# Patient Record
Sex: FEMALE | Race: BLACK OR AFRICAN AMERICAN | Employment: PART TIME | URBAN - METROPOLITAN AREA
[De-identification: names, ages, dates, MRNs, and addresses within clinical notes are randomized per-mention and may not be internally consistent; named-entity substitution may affect disease eponyms.]

---

## 2021-12-29 ENCOUNTER — OFFICE VISIT (OUTPATIENT)
Dept: FAMILY MEDICINE CLINIC | Age: 26
End: 2021-12-29
Payer: MEDICAID

## 2021-12-29 VITALS
HEART RATE: 100 BPM | TEMPERATURE: 97.5 F | DIASTOLIC BLOOD PRESSURE: 82 MMHG | WEIGHT: 255.8 LBS | HEIGHT: 66 IN | OXYGEN SATURATION: 97 % | BODY MASS INDEX: 41.11 KG/M2 | RESPIRATION RATE: 16 BRPM | SYSTOLIC BLOOD PRESSURE: 119 MMHG

## 2021-12-29 DIAGNOSIS — Z76.89 ESTABLISHING CARE WITH NEW DOCTOR, ENCOUNTER FOR: Primary | ICD-10-CM

## 2021-12-29 DIAGNOSIS — G47.33 OSA (OBSTRUCTIVE SLEEP APNEA): ICD-10-CM

## 2021-12-29 DIAGNOSIS — D50.9 IRON DEFICIENCY ANEMIA, UNSPECIFIED IRON DEFICIENCY ANEMIA TYPE: ICD-10-CM

## 2021-12-29 DIAGNOSIS — Z13.29 SCREENING FOR THYROID DISORDER: ICD-10-CM

## 2021-12-29 DIAGNOSIS — Z11.3 SCREEN FOR STD (SEXUALLY TRANSMITTED DISEASE): ICD-10-CM

## 2021-12-29 DIAGNOSIS — F41.9 ANXIETY: ICD-10-CM

## 2021-12-29 DIAGNOSIS — F41.0 PANIC DISORDER: ICD-10-CM

## 2021-12-29 DIAGNOSIS — Z13.21 ENCOUNTER FOR VITAMIN DEFICIENCY SCREENING: ICD-10-CM

## 2021-12-29 DIAGNOSIS — Z11.59 NEED FOR HEPATITIS C SCREENING TEST: ICD-10-CM

## 2021-12-29 DIAGNOSIS — Z13.1 SCREENING FOR DIABETES MELLITUS: ICD-10-CM

## 2021-12-29 DIAGNOSIS — Z13.220 SCREENING, LIPID: ICD-10-CM

## 2021-12-29 PROCEDURE — 99204 OFFICE O/P NEW MOD 45 MIN: CPT | Performed by: STUDENT IN AN ORGANIZED HEALTH CARE EDUCATION/TRAINING PROGRAM

## 2021-12-29 NOTE — PATIENT INSTRUCTIONS
Many people with depression and anxiety find relief without prescription drugs. These methods have all been scientifically proven to help ease symptoms. Here's what the research recommends:    EXERCISE  One of the most-studied natural approaches to treating depression, regular physical activity may lift mood in part by increasing certain neurotransmitters. Of course, embracing an exercise habit isn't easy for most people--especially those with depression. \"In my experience, the last thing depressed people want to do is move,\" says Dr. Keely Umanzor,  of the [facility]. \"But it has a striking effect. \"  Plus, it's free. COGNITIVE-BEHAVIORAL THERAPY  CBT, a type of talk therapy, focuses on changing negative thought patterns, and then learning how to home in on specific problems and find new ways to approach them. It typically lasts for 10 to 20 sessions. Some studies have shown it to be as effective as medication. [address] [place] [address] with depression often withdraw from the world, and this therapy seeks to bring them back in. Treatment involves helping people identify activities that add meaning to their life, like reading, volunteering or hanging out with friends, and encourages them to do these things without waiting for their mood to lift first. In a recent study published in the Lancet, this kind of therapy was shown to be as effective as CBT. And it costs much less, because practitioners don't need as much training. MINDFULNESS TRAINING  \"Thoughts and images are often the source of sadness and fear, and if you have no training in getting your attention away from them, you're helpless,\" says Guy Horvath. One such program, an eight-week small-group treatment called mindfulness-based cognitive therapy, trains people to be aware of the present moment through mindfulness practices like gentle yoga and daily meditation.  It was shown in a 2016 study in [address] [facility] to help people with recurrent depression avoid relapses even better than antidepressants. Cognitive Behavioral Skills You'll Need to Beat Anxiety    Five essential skills for overcoming anxiety and getting on with a happy life. 1. The ability to tolerate uncertainty. Studies have shown that intolerance of uncertainty is a key factor in anxiety and depression. People who can't tolerate uncertainty often avoid situations, procrastinate, seek reassurance constantly, delay taking action, do excessive checking, and refuse to delegate. 2. The ability to recognize rumination. Rumination is when you're repeatedly bothered by a worry thought. When people ruminate, their problem solving capacity is reduced. If you're ruminating, it's often best to wait to attempt to problem solve until you can think about the issue without jumping straight into rumination mode. If you can learn to recognize when you're ruminating, you can use cognitive behavioral therapy techniques, defusion techniques, or mindfulness techniques to help you stop ruminating. The best thing you can do when you're ruminating is accept that you're having whatever thoughts you're having, recognize that the thoughts might not be accurate, and allow the thoughts to pass in their own time rather than trying to block them out. Trying to block out distressing thoughts will just cause increased intensity and intrusions of the thoughts you're trying not to have. 3. The ability to recognize thought distortions. Types of thought distortions include: making excessively negative predictions, underestimating your ability to cope, personalizing, mind reading, catastrophizing, \"shoulds\" and \"musts,\" making judgments of yourself or others that are black and white rather than gray, entitlement thoughts (e.g., thinking that the normal rules shouldn't apply to you), and more.     The key is recognizing thought distortions is to ask yourself what thoughts you're having when you feel distressed. Some of these thoughts are likely to be thought distortions. 4. The ability and willingness to use mindfulness techniques. Mindfulness techniques can help reduce anxiety and increase willpower. Practicing mindfulness will help you stop avoidance coping, make better choices even when you're feeling anxious, and help you ruminate less. Try this 10-minute mindful walking exercise to get started. 5. The ability to talk to yourself kindly about your imperfections and mistakes. Criticizing yourself harshly when you make a mistake or when one of your personal imperfections shows up is likely to lead to rumination and avoidance coping. Research has shown that talking to yourself kindly not only helps you feel better--it also increases your motivation for self-improvement. Hydroxyzine (By mouth)   Hydroxyzine Pamoate (idq-JBXO-m-zeen ELIZA-oh-ate)  Treats anxiety, nausea, vomiting, allergies, skin rash, hives, and itching. May also be used with anesthesia for medical procedures. Brand Name(s): Vistaril   There may be other brand names for this medicine. When This Medicine Should Not Be Used: This medicine is not right for everyone. Do not use it if you had an allergic reaction to hydroxyzine, cetirizine, or levocetirizine. Do not use it during the early part of pregnancy or if you have prolonged QT interval.  How to Use This Medicine:   Capsule, Liquid, Tablet  · Your doctor will tell you how much medicine to use. Do not use more than directed. · Oral liquid: Measure the oral liquid medicine with a marked measuring spoon, oral syringe, or medicine cup. Shake well just before use. · Tablet: Swallow whole. Do not break, crush, or chew it. · Missed dose: Take a dose as soon as you remember. If it is almost time for your next dose, wait until then and take a regular dose. Do not take extra medicine to make up for a missed dose.   · Store the medicine in a closed container at room temperature, away from heat, moisture, and direct light. Drugs and Foods to Avoid:   Ask your doctor or pharmacist before using any other medicine, including over-the-counter medicines, vitamins, and herbal products. · Some medicines can affect how hydroxyzine works. Tell your doctor if you are using any of the following:  ¨ Droperidol, methadone, ondansetron, pentamidine  ¨ Antibiotic (including azithromycin, clarithromycin, erythromycin, gatifloxacin, moxifloxacin)  ¨ Medicine to treat depression (including citalopram, fluoxetine)  ¨ Medicine to treat heart rhythm problems (including amiodarone, procainamide, quinidine, sotalol)  ¨ Medicine to treat mental health problems (including chlorpromazine, clozapine, iloperidone, quetiapine, ziprasidone)  · Tell your doctor if you use anything else that makes you sleepy. Some examples are allergy medicine, narcotic pain medicine, and alcohol. Warnings While Using This Medicine:   · Tell your doctor if you are pregnant or breastfeeding, or if you have heart disease, heart failure, a slow heartbeat, skin problems, or had a recent heart attack. · This medicine may cause the following problems:  ¨ Changes in your heart rhythm  ¨ Serious skin reaction called acute generalized exanthematous pustulosis (AGEP)  · This medicine may make you drowsy. Do not drive or do anything that could be dangerous until you know how this medicine affects you. · Call your doctor if your symptoms do not improve or if they get worse. · Keep all medicine out of the reach of children. Never share your medicine with anyone.   Possible Side Effects While Using This Medicine:   Call your doctor right away if you notice any of these side effects:  · Allergic reaction: Itching or hives, swelling in your face or hands, swelling or tingling in your mouth or throat, chest tightness, trouble breathing  · Fainting, dizziness, lightheadedness  · Fast, pounding, or uneven heartbeat  · Fever, skin rash  · Severe tiredness  If you notice these less serious side effects, talk with your doctor:   · Drowsiness, sleepiness  · Dry mouth  If you notice other side effects that you think are caused by this medicine, tell your doctor. Call your doctor for medical advice about side effects. You may report side effects to FDA at 9-693-NGC-9604  © 2017 [facility] Information is for End User's use only and may not be sold, redistributed or otherwise used for commercial purposes. The above information is an  only. It is not intended as medical advice for individual conditions or treatments. Talk to your doctor, nurse or pharmacist before following any medical regimen to see if it is safe and effective for you.

## 2021-12-29 NOTE — PROGRESS NOTES
Lea Hernandez is a 27 y.o. female (: 1995) presenting to address:    Chief Complaint   Patient presents with   [address]     Patient DECLINED flu vaccine. Vitals:    21 1321   BP: 119/82   Pulse: 100   Resp: 16   Temp: 97.5 °F (36.4 °C)   TempSrc: Temporal   SpO2: 97%   Weight: 255 lb 12.8 oz (116 kg)   Height: 5' 6\" (1.676 m)   PainSc:   0 - No pain   LMP: 2021       Hearing/Vision:   No exam data present    Learning Assessment:     Learning Assessment 2021   PRIMARY LEARNER Patient   HIGHEST LEVEL OF EDUCATION - PRIMARY LEARNER  GRADUATED HIGH SCHOOL OR GED   PRIMARY LANGUAGE ENGLISH   LEARNER PREFERENCE PRIMARY DEMONSTRATION   ANSWERED BY self   RELATIONSHIP SELF     Depression Screening:   No flowsheet data found. Fall Risk Assessment:     Fall Risk Assessment, last 12 mths 2021   Able to walk? Yes   Fall in past 12 months? 0   Do you feel unsteady? 0   Are you worried about falling 0     Abuse Screening:     Abuse Screening Questionnaire 2021   Do you ever feel afraid of your partner? N   Are you in a relationship with someone who physically or mentally threatens you? N   Is it safe for you to go home? Y     ADL Assessment:   No flowsheet data found. Coordination of Care Questionaire:   1. \"Have you been to the ER, urgent care clinic since your last visit? Hospitalized since your last visit? \" No    2. \"Have you seen or consulted any other health care providers outside of the [address] [place] [address] since your last visit? \" No     3. For patients aged 39-70: Has the patient had a colonoscopy? NA based on age or sex     If the patient is female:    3. For patients aged 41-77: Has the patient had a mammogram within the past 2 years? NA based on age or sex    11. For patients aged 21-65: Has the patient had a pap smear? No    Advanced Directive:   1. Do you have an Advanced Directive? NO    2. Would you like information on Advanced Directives?  NO

## 2021-12-29 NOTE — PROGRESS NOTES
Note to patient:  The purpose of this note is to communicate optimally with the other physicians / APCs involved in your care. It is written using standard medical terminology. If you have questions regarding the details of the note, please contact my office to schedule an appointment to address your questions. Emil Meyers   Primary Care Office Visit - Problem-Oriented    : 1995   Ambreen Marley is a 27 y.o. female presenting for  Chief Complaint   Patient presents with   [facility] Establish Care          Assessment/Plan:       ICD-10-CM ICD-9-CM   1. Establishing care with new doctor, encounter for  Z76.89 V65.8   2. BMI 40.0-44.9, adult ([address]  Z68.41 V85.41   3. Anxiety  F41.9 300.00   4. Screening for thyroid disorder  Z13.29 V77.0   5. Encounter for vitamin deficiency screening  Z13.21 V77.99   6. Screening for diabetes mellitus  Z13.1 V77.1   7. Need for hepatitis C screening test  Z11.59 V73.89   8. Screening, lipid  Z13.220 V77.91   9. Iron deficiency anemia, unspecified iron deficiency anemia type  D50.9 280.9   10. Screen for STD (sexually transmitted disease)  Z11.3 V74.5   11. LIAM (obstructive sleep apnea)  G47.33 327.23   12. Panic disorder  F41.0 300.01     #LIAM - Had CPAP and lost in transition of states   Last sleep study - 4yrs ago   Referral to sleep medicine    #Body mass index is 41.29 kg/m². Counseled on diet and exercise methods. Positive reinforcement provided. Actively working on weight loss, Already lost 11 lbs! Established with Bariatric     Hx of #significant anxiety with panic and multiple hospitalizations; last panic attack years ago   Declines interest in starting treatment to reduce frequency/severity of symptoms  Reviewed risk/benefit of both preventative daily treatment and rescue (prn) medication. Provided handout describing Vistaril. Reviewed prior med trials. With meds, mostly felt groggy;  Ativan effective for panic, Response to zoloft was \"ok\"  Counseled on nonpharmacologic interventions that could improve outcomes as well including exercise, therapy/counseling, and self care. #Iron deficiency anemia - unclear control  Will check levels    #HM  Reports from [place] and vaccines up to date including HPV  Cervical CA screening - Last PAP 4 years ago - Normal    Orders Placed This Encounter    HEMOGLOBIN A1C WITH EAG     Standing Status:   Future     Standing Expiration Date:   12/29/2022    CBC WITH AUTOMATED DIFF     Standing Status:   Future     Standing Expiration Date:   02/16/6513    METABOLIC PANEL, COMPREHENSIVE     Standing Status:   Future     Standing Expiration Date:   12/29/2022    T4, FREE     Standing Status:   Future     Standing Expiration Date:   12/29/2022    LIPID PANEL     Standing Status:   Future     Standing Expiration Date:   12/29/2022    TSH 3RD GENERATION     Standing Status:   Future     Standing Expiration Date:   12/29/2022    VITAMIN D, 25 HYDROXY     Standing Status:   Future     Standing Expiration Date:   12/29/2022    FERRITIN     Standing Status:   Future     Standing Expiration Date:   12/30/2022    IRON PROFILE     Standing Status:   Future     Standing Expiration Date:   12/30/2022    VITAMIN B12     Standing Status:   Future     Standing Expiration Date:   12/30/2022    HIV 1/2 AG/AB, 4TH GENERATION,W RFLX CONFIRM     Standing Status:   Future     Standing Expiration Date:   12/30/2022    RPR     Standing Status:   Future     Standing Expiration Date:   12/29/2022    HEPATITIS C AB     Standing Status:   Future     Standing Expiration Date:   12/30/2022   [facility] REFERRAL     Referral Priority:   Routine     Referral Type:   Consultation     Referral Reason:   Specialty Services Required     Requested Specialty:   Sleep Medicine     Number of Visits Requested:   1     Follow-up and Dispositions    · Return for when able to update PAP .        Reviewed management plan & instructions with patient, who voiced understanding. Subjective   History:   Elsa Don is a 27 y.o. female presenting to address:  Chief Complaint   Patient presents with   [employer] Establish Care     Extensive review of medical history and medications completed to facilitate transfer of care. Review of Systems:     A comprehensive review of systems was negative except for that written in the HPI and A/P         Objective     Physical Assessment:     Visit Vitals  /82 (BP 1 Location: Left upper arm, BP Patient Position: Sitting, BP Cuff Size: Large adult)   Pulse 100   Temp 97.5 °F (36.4 °C) (Temporal)   Resp 16   Ht 5' 6\" (1.676 m)   Wt 255 lb 12.8 oz (116 kg)   LMP 12/17/2021   SpO2 97%   BMI 41.29 kg/m²       Physical Exam  Vitals and nursing note reviewed. Constitutional:       General: She is not in acute distress. Cardiovascular:      Rate and Rhythm: Regular rhythm. Tachycardia present. Pulses: Normal pulses. Pulmonary:      Effort: Pulmonary effort is normal. No respiratory distress. Neurological:      Mental Status: She is alert and oriented to person, place, and time. Gait: Gait normal.   Psychiatric:         Mood and Affect: Mood normal.         Behavior: Behavior normal.         Thought Content: Thought content normal.         Judgment: Judgment normal.                 This document may have been created with the aid of dictation software. Text may contain errors, particularly phonetic errors.       Filemon Ott, DO  Family Medicine  12/29/2021

## 2022-04-13 ENCOUNTER — OFFICE VISIT (OUTPATIENT)
Dept: FAMILY MEDICINE CLINIC | Age: 27
End: 2022-04-13

## 2022-04-13 ENCOUNTER — APPOINTMENT (OUTPATIENT)
Dept: FAMILY MEDICINE CLINIC | Age: 27
End: 2022-04-13

## 2022-04-13 VITALS
DIASTOLIC BLOOD PRESSURE: 82 MMHG | BODY MASS INDEX: 39.02 KG/M2 | HEART RATE: 99 BPM | SYSTOLIC BLOOD PRESSURE: 118 MMHG | TEMPERATURE: 98.1 F | OXYGEN SATURATION: 98 % | RESPIRATION RATE: 16 BRPM | WEIGHT: 242.8 LBS | HEIGHT: 66 IN

## 2022-04-13 DIAGNOSIS — Z13.21 ENCOUNTER FOR VITAMIN DEFICIENCY SCREENING: ICD-10-CM

## 2022-04-13 DIAGNOSIS — Z13.29 SCREENING FOR THYROID DISORDER: ICD-10-CM

## 2022-04-13 DIAGNOSIS — G47.33 OSA (OBSTRUCTIVE SLEEP APNEA): ICD-10-CM

## 2022-04-13 DIAGNOSIS — Z11.59 NEED FOR HEPATITIS C SCREENING TEST: ICD-10-CM

## 2022-04-13 DIAGNOSIS — F41.9 ANXIETY: ICD-10-CM

## 2022-04-13 DIAGNOSIS — Z13.1 SCREENING FOR DIABETES MELLITUS: ICD-10-CM

## 2022-04-13 DIAGNOSIS — F41.0 PANIC DISORDER: ICD-10-CM

## 2022-04-13 DIAGNOSIS — Z13.220 SCREENING, LIPID: ICD-10-CM

## 2022-04-13 DIAGNOSIS — R42 POSTURAL LIGHTHEADEDNESS: ICD-10-CM

## 2022-04-13 DIAGNOSIS — Z11.3 SCREEN FOR STD (SEXUALLY TRANSMITTED DISEASE): ICD-10-CM

## 2022-04-13 DIAGNOSIS — D50.9 IRON DEFICIENCY ANEMIA, UNSPECIFIED IRON DEFICIENCY ANEMIA TYPE: ICD-10-CM

## 2022-04-13 PROCEDURE — 99214 OFFICE O/P EST MOD 30 MIN: CPT | Performed by: STUDENT IN AN ORGANIZED HEALTH CARE EDUCATION/TRAINING PROGRAM

## 2022-04-13 RX ORDER — ESCITALOPRAM OXALATE 10 MG/1
10 TABLET ORAL DAILY
Qty: 90 TABLET | Refills: 1 | Status: SHIPPED | OUTPATIENT
Start: 2022-04-13 | End: 2022-08-02

## 2022-04-13 RX ORDER — LORATADINE 10 MG/1
10 TABLET ORAL DAILY
COMMUNITY
Start: 2022-04-05

## 2022-04-13 RX ORDER — ESCITALOPRAM OXALATE 5 MG/1
TABLET ORAL
Qty: 7 TABLET | Refills: 1 | Status: SHIPPED | OUTPATIENT
Start: 2022-04-13 | End: 2022-08-02

## 2022-04-13 NOTE — LETTER
NOTIFICATION RETURN TO WORK / SCHOOL    4/13/2022 2:39 PM    Ms. [address] [facility] [address]      To Whom It May Concern:    Saji Cruz is currently under the care of [address] [place] [address]. She was seen in office 4/13/2022 and can return to work 4/14/22 without restriction. If there are questions or concerns please have the patient contact our office.         Sincerely,        Em Varner, DO

## 2022-04-13 NOTE — PATIENT INSTRUCTIONS
[facility]  [facility] [address]  [address] [facility],  Rupinder Agudelo  Tel: [phone]  Fax: [phone]    Many people with depression and anxiety can find relief with adding some of the following methods to their treatment plan. These methods have all been scientifically proven to help ease symptoms. Here's what the research recommends:    EXERCISE  One of the most-studied natural approaches to treating depression, regular physical activity may lift mood in part by increasing certain neurotransmitters. Of course, embracing an exercise habit isn't easy for most people--especially those with depression. \"In my experience, the last thing depressed people want to do is move,\" says Dr. Brian Son,  of the [facility]. \"But it has a striking effect. \"  Plus, it's free. COGNITIVE-BEHAVIORAL THERAPY  CBT, a type of talk therapy, focuses on changing negative thought patterns, and then learning how to home in on specific problems and find new ways to approach them. It typically lasts for 10 to 20 sessions. Some studies have shown it to be as effective as medication. [address] [place] [address] with depression often withdraw from the world, and this therapy seeks to bring them back in. Treatment involves helping people identify activities that add meaning to their life, like reading, volunteering or hanging out with friends, and encourages them to do these things without waiting for their mood to lift first. In a recent study published in the Lancet, this kind of therapy was shown to be as effective as CBT. And it costs much less, because practitioners don't need as much training. MINDFULNESS TRAINING  \"Thoughts and images are often the source of sadness and fear, and if you have no training in getting your attention away from them, you're helpless,\" says Shamar Morfin.  One such program, an eight-week small-group treatment called mindfulness-based cognitive therapy, trains people to be aware of the present moment through mindfulness practices like gentle yoga and daily meditation. It was shown in a 2016 study in [address] [facility] to help people with recurrent depression avoid relapses even better than antidepressants. Cognitive Behavioral Skills You'll Need to Beat Anxiety    Five essential skills for overcoming anxiety and getting on with a happy life. 1. The ability to tolerate uncertainty. Studies have shown that intolerance of uncertainty is a key factor in anxiety and depression. People who can't tolerate uncertainty often avoid situations, procrastinate, seek reassurance constantly, delay taking action, do excessive checking, and refuse to delegate. 2. The ability to recognize rumination. Rumination is when you're repeatedly bothered by a worry thought. When people ruminate, their problem solving capacity is reduced. If you're ruminating, it's often best to wait to attempt to problem solve until you can think about the issue without jumping straight into rumination mode. If you can learn to recognize when you're ruminating, you can use cognitive behavioral therapy techniques, defusion techniques, or mindfulness techniques to help you stop ruminating. The best thing you can do when you're ruminating is accept that you're having whatever thoughts you're having, recognize that the thoughts might not be accurate, and allow the thoughts to pass in their own time rather than trying to block them out. Trying to block out distressing thoughts will just cause increased intensity and intrusions of the thoughts you're trying not to have. 3. The ability to recognize thought distortions.     Types of thought distortions include: making excessively negative predictions, underestimating your ability to cope, personalizing, mind reading, catastrophizing, \"shoulds\" and \"musts,\" making judgments of yourself or others that are black and white rather than gray, entitlement thoughts (e.g., thinking that the normal rules shouldn't apply to you), and more. The key is recognizing thought distortions is to ask yourself what thoughts you're having when you feel distressed. Some of these thoughts are likely to be thought distortions. 4. The ability and willingness to use mindfulness techniques. Mindfulness techniques can help reduce anxiety and increase willpower. Practicing mindfulness will help you stop avoidance coping, make better choices even when you're feeling anxious, and help you ruminate less. Try this 10-minute mindful walking exercise to get started. 5. The ability to talk to yourself kindly about your imperfections and mistakes. Criticizing yourself harshly when you make a mistake or when one of your personal imperfections shows up is likely to lead to rumination and avoidance coping. Research has shown that talking to yourself kindly not only helps you feel better--it also increases your motivation for self-improvement.

## 2022-04-13 NOTE — PROGRESS NOTES
Ricki Pineda is a 27 y.o. female (: 1995) presenting to address:    Chief Complaint   Patient presents with    Dizziness     follow up from [facility] on 22    Anxiety       Vitals:    22 1409   BP: 118/82   Pulse: 99   Resp: 16   Temp: 98.1 °F (36.7 °C)   TempSrc: Temporal   SpO2: 98%   Weight: 242 lb 12.8 oz (110.1 kg)   Height: 5' 6\" (1.676 m)   PainSc:   0 - No pain       Hearing/Vision:   No exam data present    Learning Assessment:     Learning Assessment 2021   PRIMARY LEARNER Patient   HIGHEST LEVEL OF EDUCATION - PRIMARY LEARNER  GRADUATED HIGH SCHOOL OR GED   PRIMARY LANGUAGE ENGLISH   LEARNER PREFERENCE PRIMARY DEMONSTRATION   ANSWERED BY self   RELATIONSHIP SELF     Depression Screening:     3 most recent PHQ Screens 2022   Little interest or pleasure in doing things Several days   Feeling down, depressed, irritable, or hopeless Several days   Total Score PHQ 2 2   Trouble falling or staying asleep, or sleeping too much Several days   Feeling tired or having little energy Several days   Poor appetite, weight loss, or overeating Several days   Feeling bad about yourself - or that you are a failure or have let yourself or your family down Several days   Trouble concentrating on things such as school, work, reading, or watching TV Several days   Moving or speaking so slowly that other people could have noticed; or the opposite being so fidgety that others notice Several days   Thoughts of being better off dead, or hurting yourself in some way Not at all   PHQ 9 Score 8   How difficult have these problems made it for you to do your work, take care of your home and get along with others (No Data)     Fall Risk Assessment:     Fall Risk Assessment, last 12 mths 2022   Able to walk? Yes   Fall in past 12 months? 0   Do you feel unsteady?  0   Are you worried about falling 0     Abuse Screening:     Abuse Screening Questionnaire 2022   Do you ever feel afraid of your partner? N   Are you in a relationship with someone who physically or mentally threatens you? N   Is it safe for you to go home? Y     ADL Assessment:   No flowsheet data found. Coordination of Care Questionaire:   1. \"Have you been to the ER, urgent care clinic since your last visit? Hospitalized since your last visit? \" Yes Where: [facility] Reason for visit: Dizziness    2. \"Have you seen or consulted any other health care providers outside of the [address] [place] [address] since your last visit? \" No     3. For patients aged 39-70: Has the patient had a colonoscopy? NA - based on age     If the patient is female:    4. For patients aged 41-77: Has the patient had a mammogram within the past 2 years? NA - based on age    11. For patients aged 21-65: Has the patient had a pap smear? No    Advanced Directive:   1. Do you have an Advanced Directive? NO    2. Would you like information on Advanced Directives?  NO

## 2022-04-14 LAB
25(OH)D3+25(OH)D2 SERPL-MCNC: 25.9 NG/ML (ref 30–100)
ALBUMIN SERPL-MCNC: 3.9 G/DL (ref 3.9–5)
ALBUMIN/GLOB SERPL: 1.1 {RATIO} (ref 1.2–2.2)
ALP SERPL-CCNC: 63 IU/L (ref 44–121)
ALT SERPL-CCNC: 7 IU/L (ref 0–32)
AST SERPL-CCNC: 15 IU/L (ref 0–40)
BASOPHILS # BLD AUTO: 0.1 X10E3/UL (ref 0–0.2)
BASOPHILS NFR BLD AUTO: 1 %
BILIRUB SERPL-MCNC: 0.2 MG/DL (ref 0–1.2)
BUN SERPL-MCNC: 11 MG/DL (ref 6–20)
BUN/CREAT SERPL: 17 (ref 9–23)
CALCIUM SERPL-MCNC: 9.2 MG/DL (ref 8.7–10.2)
CHLORIDE SERPL-SCNC: 100 MMOL/L (ref 96–106)
CHOLEST SERPL-MCNC: 179 MG/DL (ref 100–199)
CO2 SERPL-SCNC: 22 MMOL/L (ref 20–29)
CREAT SERPL-MCNC: 0.64 MG/DL (ref 0.57–1)
EGFR: 126 ML/MIN/1.73
EOSINOPHIL # BLD AUTO: 0.1 X10E3/UL (ref 0–0.4)
EOSINOPHIL NFR BLD AUTO: 1 %
ERYTHROCYTE [DISTWIDTH] IN BLOOD BY AUTOMATED COUNT: 16.8 % (ref 11.7–15.4)
EST. AVERAGE GLUCOSE BLD GHB EST-MCNC: 120 MG/DL
FERRITIN SERPL-MCNC: 7 NG/ML (ref 15–150)
GLOBULIN SER CALC-MCNC: 3.7 G/DL (ref 1.5–4.5)
GLUCOSE SERPL-MCNC: 97 MG/DL (ref 65–99)
HBA1C MFR BLD: 5.8 % (ref 4.8–5.6)
HCT VFR BLD AUTO: 38.4 % (ref 34–46.6)
HCV AB S/CO SERPL IA: 0.3 S/CO RATIO (ref 0–0.9)
HDLC SERPL-MCNC: 46 MG/DL
HGB BLD-MCNC: 11 G/DL (ref 11.1–15.9)
HIV 1+2 AB+HIV1 P24 AG SERPL QL IA: NON REACTIVE
IMM GRANULOCYTES # BLD AUTO: 0.1 X10E3/UL (ref 0–0.1)
IMM GRANULOCYTES NFR BLD AUTO: 1 %
IMP & REVIEW OF LAB RESULTS: NORMAL
IRON SATN MFR SERPL: 19 % (ref 15–55)
IRON SERPL-MCNC: 81 UG/DL (ref 27–159)
LDLC SERPL CALC-MCNC: 115 MG/DL (ref 0–99)
LYMPHOCYTES # BLD AUTO: 2.4 X10E3/UL (ref 0.7–3.1)
LYMPHOCYTES NFR BLD AUTO: 30 %
MCH RBC QN AUTO: 19.5 PG (ref 26.6–33)
MCHC RBC AUTO-ENTMCNC: 28.6 G/DL (ref 31.5–35.7)
MCV RBC AUTO: 68 FL (ref 79–97)
MONOCYTES # BLD AUTO: 0.7 X10E3/UL (ref 0.1–0.9)
MONOCYTES NFR BLD AUTO: 9 %
NEUTROPHILS # BLD AUTO: 4.6 X10E3/UL (ref 1.4–7)
NEUTROPHILS NFR BLD AUTO: 58 %
PLATELET # BLD AUTO: 293 X10E3/UL (ref 150–450)
POTASSIUM SERPL-SCNC: 3.9 MMOL/L (ref 3.5–5.2)
PROT SERPL-MCNC: 7.6 G/DL (ref 6–8.5)
RBC # BLD AUTO: 5.63 X10E6/UL (ref 3.77–5.28)
RPR SER QL: NON REACTIVE
SODIUM SERPL-SCNC: 137 MMOL/L (ref 134–144)
T4 FREE SERPL-MCNC: 1.13 NG/DL (ref 0.82–1.77)
TIBC SERPL-MCNC: 417 UG/DL (ref 250–450)
TRIGL SERPL-MCNC: 96 MG/DL (ref 0–149)
TSH SERPL DL<=0.005 MIU/L-ACNC: 1.21 UIU/ML (ref 0.45–4.5)
UIBC SERPL-MCNC: 336 UG/DL (ref 131–425)
VIT B12 SERPL-MCNC: 635 PG/ML (ref 232–1245)
VLDLC SERPL CALC-MCNC: 18 MG/DL (ref 5–40)
WBC # BLD AUTO: 7.8 X10E3/UL (ref 3.4–10.8)

## 2022-05-05 ENCOUNTER — TELEPHONE (OUTPATIENT)
Dept: FAMILY MEDICINE CLINIC | Age: 27
End: 2022-05-05

## 2022-05-05 NOTE — TELEPHONE ENCOUNTER
Called patient to informed that she needs to contact pharmacy due to at visit she was informed that the Lexapro 5 mg was to be increased to the Lexapro 10 mg if tolerating well. Pharmacy already has new dose prescription on file. unable to leave  due to VM not set up.

## 2022-05-05 NOTE — TELEPHONE ENCOUNTER
----- Message from [place] sent at 5/5/2022 10:29 AM EDT -----  Subject: Refill Request    QUESTIONS  Name of Medication? escitalopram oxalate (LEXAPRO) 5 mg tablet  Patient-reported dosage and instructions? 5mg   How many days do you have left? 2  Preferred Pharmacy? [address] [facility] phone number (if available)? [phone]  ---------------------------------------------------------------------------  --------------  CALL BACK INFO  What is the best way for the office to contact you? OK to leave message on   voicemail  Preferred Call Back Phone Number? [phone]  ---------------------------------------------------------------------------  --------------  SCRIPT ANSWERS  Relationship to Patient?  Self

## 2022-08-02 ENCOUNTER — OFFICE VISIT (OUTPATIENT)
Dept: FAMILY MEDICINE CLINIC | Age: 27
End: 2022-08-02
Payer: MEDICAID

## 2022-08-02 VITALS
RESPIRATION RATE: 16 BRPM | TEMPERATURE: 98.5 F | WEIGHT: 238 LBS | SYSTOLIC BLOOD PRESSURE: 112 MMHG | BODY MASS INDEX: 38.25 KG/M2 | OXYGEN SATURATION: 97 % | HEART RATE: 102 BPM | DIASTOLIC BLOOD PRESSURE: 75 MMHG | HEIGHT: 66 IN

## 2022-08-02 DIAGNOSIS — R42 POSTURAL LIGHTHEADEDNESS: ICD-10-CM

## 2022-08-02 DIAGNOSIS — F41.9 ANXIETY: ICD-10-CM

## 2022-08-02 DIAGNOSIS — D50.9 IRON DEFICIENCY ANEMIA, UNSPECIFIED IRON DEFICIENCY ANEMIA TYPE: ICD-10-CM

## 2022-08-02 DIAGNOSIS — R73.03 PREDIABETES: Primary | ICD-10-CM

## 2022-08-02 DIAGNOSIS — Z09 HOSPITAL DISCHARGE FOLLOW-UP: ICD-10-CM

## 2022-08-02 DIAGNOSIS — B36.0 TINEA VERSICOLOR: ICD-10-CM

## 2022-08-02 DIAGNOSIS — R00.2 PALPITATIONS: ICD-10-CM

## 2022-08-02 DIAGNOSIS — E66.01 CLASS 2 SEVERE OBESITY DUE TO EXCESS CALORIES WITH SERIOUS COMORBIDITY AND BODY MASS INDEX (BMI) OF 38.0 TO 38.9 IN ADULT (HCC): ICD-10-CM

## 2022-08-02 LAB — HBA1C MFR BLD HPLC: 5.7 %

## 2022-08-02 PROCEDURE — 83036 HEMOGLOBIN GLYCOSYLATED A1C: CPT | Performed by: STUDENT IN AN ORGANIZED HEALTH CARE EDUCATION/TRAINING PROGRAM

## 2022-08-02 PROCEDURE — 99214 OFFICE O/P EST MOD 30 MIN: CPT | Performed by: STUDENT IN AN ORGANIZED HEALTH CARE EDUCATION/TRAINING PROGRAM

## 2022-08-02 RX ORDER — BUSPIRONE HYDROCHLORIDE 5 MG/1
TABLET ORAL
Qty: 90 TABLET | Refills: 1 | Status: SHIPPED | OUTPATIENT
Start: 2022-08-02

## 2022-08-02 RX ORDER — MECLIZINE HYDROCHLORIDE 25 MG/1
25 TABLET ORAL AS NEEDED
COMMUNITY
Start: 2022-07-01 | End: 2022-08-02 | Stop reason: ALTCHOICE

## 2022-08-02 RX ORDER — KETOCONAZOLE 20 MG/G
CREAM TOPICAL
Qty: 30 G | Refills: 2 | Status: SHIPPED | OUTPATIENT
Start: 2022-08-02

## 2022-08-02 RX ORDER — LANOLIN ALCOHOL/MO/W.PET/CERES
325 CREAM (GRAM) TOPICAL EVERY OTHER DAY
Qty: 45 TABLET | Refills: 1
Start: 2022-08-02

## 2022-08-02 NOTE — PATIENT INSTRUCTIONS
Your blood levels and iron studies show some iron deficiency anemia which can contribute to symptoms of fatigue, weakness, headaches, restless legs, and exercise intolerance. To correct this I would supplement with over the counter 65mg Iron every other day. Sometimes this supplement can cause constipation and if you notice this you can try reducing the dosing or switching to a slow absorption iron supplement, or liquid iron to see if better tolerated. Our goal would be to improve iron storage level (ferritin) above 75.

## 2022-08-02 NOTE — PROGRESS NOTES
Marlee Gill (: 1995) is a 27 y.o. female, ESTABLISHED patient, here for FOLLOW UP:    ICD-10-CM ICD-9-CM   1. Prediabetes  R73.03 790.29   2. Palpitations  R00.2 785.1   3. Class 2 severe obesity due to excess calories with serious comorbidity and body mass index (BMI) of 38.0 to 38.9 in adult (HCC)  E66.01 278.01    Z68.38 V85.38   4. Postural lightheadedness  R42 780.4   5. Anxiety  F41.9 300.00   6. Iron deficiency anemia, unspecified iron deficiency anemia type  D50.9 280.9   7. Tinea versicolor  B36.0 111.0   8. Hospital discharge follow-up  Z09 V67.59     Assessment   Plan     #Postural Lightheadedness - no LOC; [place] 2022 however left job shortly after without improvement   Description: Feeling like going to faint, lightheaded (90% of day), Worse around bright light, people, motion; Not room spinning; Feels like constantly on elevator   Reviewed vestibular exercises and provided handout to try at home   Rec review ED visit 22, 22; CT Brain 22 IMPRESSION Negative noncontrast head CT. Reviewed uncontrolled LIAM could be contributing   Encouraged to update eye exam   Plan for Holter    #LIAM - untreated  Had CPAP and lost in transition of states   Last sleep study - 4yrs ago   Referral to sleep medicine provided 1/15/22, forgot to schedule. Provided number of Neurologist and encouraged to set up evaluation as uncontrolled LIAM can contribute to many of recent sx. Hx of #significant anxiety with panic and multiple hospitalizations; last panic attack years ago   Counseled on nonpharmacologic interventions that could improve outcomes as well including exercise, therapy/counseling, and self care. Prior drug trials: With meds, mostly felt groggy  Ativan effective for panic  Response to zoloft was \"ok\"  Tried Lexapro up to 10mg - used on and off <1mo;  Pt felt like mood was more down than normal and discontinued    Current regimen/adjustments:  Plan to trail Buspar up to TID prn for anxiety     #Iron deficiency anemia   Was taking once daily Fe supplement but was feeling upset stomach and stopped a few weeks ago. Reviewed can reduce dosing or try different form of supplement to help reduce AE. Lab Results   Component Value Date/Time    Iron 81 04/13/2022 02:54 AM    TIBC 417 04/13/2022 02:54 AM    Iron % saturation 19 04/13/2022 02:54 AM    Ferritin 7 (L) 04/13/2022 02:54 AM     #Body mass index is 39.19 kg/m². - good success with weight loss efforts! Counseled on diet and exercise methods. Positive reinforcement provided. Established with Bariatric, planning to continue with diet/exercise efforts at present and not schedule surgery. Wt Readings from Last 3 Encounters:   08/02/22 238 lb (108 kg)   04/13/22 242 lb 12.8 oz (110.1 kg)   12/29/21 255 lb 12.8 oz (116 kg)     #HLD - Goal < 100  Will monitor and encourage to work on heart healthy habits. Lab Results   Component Value Date/Time    LDL, calculated 115 (H) 04/13/2022 02:54 AM     #Prediabetes  Reviewed diagnosis and recommendations for behavioral/dietary changes to improve. Encouraged to continue once to twice yearly monitoring of A1C. Also discussed consideration of initiating Metformin to help reduce insulin resistance. Lab Results   Component Value Date/Time    Hemoglobin A1c 5.8 (H) 04/13/2022 02:54 AM     #HM  Reports from [place] and vaccines up to date including HPV        Orders Placed This Encounter    AMB POC HEMOGLOBIN A1C    DISCONTD: meclizine (ANTIVERT) 25 mg tablet     Sig: Take 25 mg by mouth as needed. busPIRone (BUSPAR) 5 mg tablet     Sig: Take 1 tablet by mouth up to three times day as needed for anxiety; Max initial dosing 15mg/day     Dispense:  90 Tablet     Refill:  1    ferrous sulfate 325 mg (65 mg iron) tablet     Sig: Take 1 Tablet by mouth every other day.  Indications: anemia from inadequate iron     Dispense:  45 Tablet Refill:  1    ketoconazole (NIZORAL) 2 % topical cream     Sig: Applied once daily for 11 to 22 days (at least a few days past complete resolution, typically 14 days)     Dispense:  30 g     Refill:  2     Return in about 1 month (around 9/2/2022) for 30 min follow up, revaluate symptoms . Subjective   Last PCP visit: 5/31/2022  Since last visit:   Any changes in health, procedures, hospital visits, or specialist visits? See A/P  Tolerating medications without adverse reactions? Stopped Lexapro     Current Outpatient Medications   Medication Instructions    busPIRone (BUSPAR) 5 mg tablet Take 1 tablet by mouth up to three times day as needed for anxiety; Max initial dosing 15mg/day    ferrous sulfate 325 mg, Oral, EVERY OTHER DAY    ketoconazole (NIZORAL) 2 % topical cream Applied once daily for 11 to 22 days (at least a few days past complete resolution, typically 14 days)    loratadine (CLARITIN) 10 mg, DAILY      Objective   Visit Vitals  /75 (BP 1 Location: Right arm, BP Patient Position: Sitting, BP Cuff Size: Large adult)   Pulse (!) 102   Temp 98.5 °F (36.9 °C) (Temporal)   Resp 16   Ht 5' 6\" (1.676 m)   Wt 238 lb (108 kg)   LMP  (Within Weeks)   SpO2 97%   BMI 38.41 kg/m²     Physical Exam  Vitals and nursing note reviewed. Constitutional:       General: She is not in acute distress. Interventions: Face mask in place. Cardiovascular:      Rate and Rhythm: Regular rhythm. Tachycardia present. Pulses: Normal pulses. Pulmonary:      Effort: Pulmonary effort is normal. No respiratory distress. Neurological:      Mental Status: She is alert and oriented to person, place, and time. Gait: Gait normal.   Psychiatric:         Attention and Perception: Attention normal.         Mood and Affect: Mood is anxious and depressed.          Behavior: Behavior normal.          Carolyn Turner,   Family Medicine  08/02/2022

## 2022-08-02 NOTE — PROGRESS NOTES
Kanu Lopes is a 27 y.o. female (: 1995) presenting to address:    Chief Complaint   Patient presents with    Medication Refill    Results     Lab review       Vitals:    22 0948   BP: 112/75   Pulse: (!) 102   Resp: 16   Temp: 98.5 °F (36.9 °C)   TempSrc: Temporal   SpO2: 97%   Weight: 238 lb (108 kg)   Height: 5' 6\" (1.676 m)   PainSc:   0 - No pain       Hearing/Vision:   No results found. Learning Assessment:     Learning Assessment 2021   PRIMARY LEARNER Patient   HIGHEST LEVEL OF EDUCATION - PRIMARY LEARNER  GRADUATED HIGH SCHOOL OR GED   PRIMARY LANGUAGE ENGLISH   LEARNER PREFERENCE PRIMARY DEMONSTRATION   ANSWERED BY self   RELATIONSHIP SELF     Depression Screening:     3 most recent PHQ Screens 2022   Little interest or pleasure in doing things Several days   Feeling down, depressed, irritable, or hopeless Several days   Total Score PHQ 2 2   Trouble falling or staying asleep, or sleeping too much Several days   Feeling tired or having little energy Several days   Poor appetite, weight loss, or overeating Several days   Feeling bad about yourself - or that you are a failure or have let yourself or your family down Several days   Trouble concentrating on things such as school, work, reading, or watching TV Several days   Moving or speaking so slowly that other people could have noticed; or the opposite being so fidgety that others notice Several days   Thoughts of being better off dead, or hurting yourself in some way Not at all   PHQ 9 Score 8   How difficult have these problems made it for you to do your work, take care of your home and get along with others (No Data)     Fall Risk Assessment:     Fall Risk Assessment, last 12 mths 2022   Able to walk? Yes   Fall in past 12 months? 0   Do you feel unsteady? 0   Are you worried about falling 0     Abuse Screening:     Abuse Screening Questionnaire 2022   Do you ever feel afraid of your partner?  N   Are you in a relationship with someone who physically or mentally threatens you? N   Is it safe for you to go home? Y     ADL Assessment:   No flowsheet data found. Coordination of Care Questionaire:   1. \"Have you been to the ER, urgent care clinic since your last visit? Hospitalized since your last visit? \" Yes When: 07/11/2022 Where: [facility] Reason for visit: Dizzy spells    2. \"Have you seen or consulted any other health care providers outside of the [address] [place] [address] since your last visit? \" No     3. For patients aged 39-70: Has the patient had a colonoscopy / FIT/ Cologuard? NA - based on age      If the patient is female:    4. For patients aged 41-77: Has the patient had a mammogram within the past 2 years? NA - based on age or sex  See top three    5. For patients aged 21-65: Has the patient had a pap smear? No    Advanced Directive:   1. Do you have an Advanced Directive? NO    2. Would you like information on Advanced Directives?  NO